# Patient Record
Sex: FEMALE | Race: BLACK OR AFRICAN AMERICAN
[De-identification: names, ages, dates, MRNs, and addresses within clinical notes are randomized per-mention and may not be internally consistent; named-entity substitution may affect disease eponyms.]

---

## 2017-08-03 ENCOUNTER — HOSPITAL ENCOUNTER (EMERGENCY)
Dept: HOSPITAL 74 - JER | Age: 62
LOS: 1 days | Discharge: HOME | End: 2017-08-04
Payer: COMMERCIAL

## 2017-08-03 VITALS — BODY MASS INDEX: 37.8 KG/M2

## 2017-08-03 VITALS — TEMPERATURE: 97.4 F

## 2017-08-03 DIAGNOSIS — D64.9: ICD-10-CM

## 2017-08-03 DIAGNOSIS — E78.5: ICD-10-CM

## 2017-08-03 DIAGNOSIS — I10: ICD-10-CM

## 2017-08-03 DIAGNOSIS — J45.909: ICD-10-CM

## 2017-08-03 DIAGNOSIS — E11.9: ICD-10-CM

## 2017-08-03 DIAGNOSIS — R10.9: ICD-10-CM

## 2017-08-03 DIAGNOSIS — K29.80: Primary | ICD-10-CM

## 2017-08-03 LAB
ALBUMIN SERPL-MCNC: 3.7 G/DL (ref 3.4–5)
ALP SERPL-CCNC: 160 U/L (ref 45–117)
ALT SERPL-CCNC: 21 U/L (ref 12–78)
ANION GAP SERPL CALC-SCNC: 8 MMOL/L (ref 8–16)
AST SERPL-CCNC: 22 U/L (ref 15–37)
BASOPHILS # BLD: 0.7 % (ref 0–2)
BILIRUB SERPL-MCNC: 0.3 MG/DL (ref 0.2–1)
CALCIUM SERPL-MCNC: 11 MG/DL (ref 8.5–10.1)
CK SERPL-CCNC: 84 IU/L (ref 26–192)
CO2 SERPL-SCNC: 27 MMOL/L (ref 21–32)
CREAT SERPL-MCNC: 1 MG/DL (ref 0.55–1.02)
DEPRECATED RDW RBC AUTO: 14.9 % (ref 11.6–15.6)
EOSINOPHIL # BLD: 0.3 % (ref 0–4.5)
GLUCOSE SERPL-MCNC: 367 MG/DL (ref 74–106)
MCH RBC QN AUTO: 24.9 PG (ref 25.7–33.7)
MCHC RBC AUTO-ENTMCNC: 30.9 G/DL (ref 32–36)
MCV RBC: 80.8 FL (ref 80–96)
NEUTROPHILS # BLD: 73 % (ref 42.8–82.8)
PLATELET # BLD AUTO: 495 K/MM3 (ref 134–434)
PMV BLD: 7.6 FL (ref 7.5–11.1)
PROT SERPL-MCNC: 8.5 G/DL (ref 6.4–8.2)
TROPONIN I SERPL-MCNC: < 0.02 NG/ML (ref 0–0.05)
WBC # BLD AUTO: 11.5 K/MM3 (ref 4–10)

## 2017-08-03 PROCEDURE — 3E033GC INTRODUCTION OF OTHER THERAPEUTIC SUBSTANCE INTO PERIPHERAL VEIN, PERCUTANEOUS APPROACH: ICD-10-PCS

## 2017-08-03 PROCEDURE — 3E033NZ INTRODUCTION OF ANALGESICS, HYPNOTICS, SEDATIVES INTO PERIPHERAL VEIN, PERCUTANEOUS APPROACH: ICD-10-PCS

## 2017-08-03 NOTE — PDOC
History of Present Illness





- General


History Source: Patient


Exam Limitations: No Limitations





- History of Present Illness


Initial Comments: 


The patient is a 61 yo F with a past medical history significant for asthma, HTN

, HLD, DM who speaks creole who presents with nausea, vomiting, indigestion, 

gas and acid reflux since last night. The patient also endorses RUQ pain. She 

states her last BM was 6 days ago. The patient states that every time she eats 

she throws up. The patient states she is unsure of her last colonoscopy. She 

notes its not normal to have 1 week between her BMs. The patient denies fevers

, chills, and diarrhea. The patient states she was unable to sleep last night 

secondary to the pain. The patient denies dysuria, hematuria, urgency and 

frequency. The patient states she takes stool softening medication and 

medication for acid reflux.  





PCP: Dr. Hansen (non affiliate)








<Fatuma Voss - Last Filed: 08/04/17 00:39>





<Vanessa Rich - Last Filed: 08/04/17 03:34>





- General


Chief Complaint: Pain


Stated Complaint: PAIN


Time Seen by Provider: 08/03/17 18:02





Past History





<Fatuma Voss - Last Filed: 08/04/17 00:39>





- Past Medical History


Anemia: Yes


Diabetes: Yes


HTN: Yes





- Psycho/Social/Smoking Cessation Hx


Anxiety: No


Suicidal Ideation: No


Smoking History: Unknown if ever smoked


Have you smoked in the past 12 months: No


Information on smoking cessation initiated: No


Hx Alcohol Use: No


Drug/Substance Use Hx: No


Substance Use Type: None





<Vanessa Rich - Last Filed: 08/04/17 03:34>





- Past Medical History


Allergies/Adverse Reactions: 


 Allergies











Allergy/AdvReac Type Severity Reaction Status Date / Time


 


No Known Allergies Allergy   Verified 08/03/17 19:02














**Review of Systems





- Review of Systems


Able to Perform ROS?: Yes


Comments:: 


GENERAL/CONSTITUTIONAL: No fever or chills. No weakness.


HEAD, EYES, EARS, NOSE AND THROAT: No change in vision. No ear pain or 

discharge. No sore throat.


CARDIOVASCULAR: No chest pain or shortness of breath.


RESPIRATORY: No cough, wheezing, or hemoptysis.


GASTROINTESTINAL: + nausea, vomiting, indigestion, gas and acid reflux


No diarrhea.


GENITOURINARY: No dysuria, frequency, or change in urination.


MUSCULOSKELETAL: No joint or muscle swelling or pain. No neck or back pain.


SKIN: No rash


NEUROLOGIC: No headache, vertigo, loss of consciousness, or change in strength/

sensation.


ENDOCRINE: No increased thirst. No abnormal weight change.


HEMATOLOGIC/LYMPHATIC: No anemia, easy bleeding, or history of blood clots.


ALLERGIC/IMMUNOLOGIC: No hives or skin allergy.





<Fatuma Voss - Last Filed: 08/04/17 00:39>





*Physical Exam





- Vital Signs


 Last Vital Signs











Temp Pulse Resp BP Pulse Ox


 


 97.4 F L  106 H  18   116/86   100 


 


 08/03/17 17:46  08/03/17 17:46  08/03/17 17:46  08/03/17 17:46  08/03/17 17:46














- Physical Exam


Comments: 


GENERAL: Awake, alert, and fully oriented, in no acute distress. Well appearing.


HEAD: No signs of trauma


EYES: PERRLA, EOMI, sclera anicteric, conjunctiva clear


ENT: Auricles normal inspection, hearing grossly normal, nares patent, 

oropharynx clear without exudates. Moist mucosa


NECK: Normal ROM, supple, no lymphadenopathy, JVD, or masses


LUNGS: Breath sounds equal, clear to auscultation bilaterally.  No wheezes, and 

no crackles


HEART: Regular rate and rhythm, normal S1 and S2, no murmurs, rubs or gallops


ABDOMEN: Soft, obese, diffusely tender mostly in the RUQ, normoactive bowel 

sounds.  No guarding, no rebound.  No masses


EXTREMITIES: Normal range of motion, no edema.  No clubbing or cyanosis. No 

cords, erythema, or tenderness


NEUROLOGICAL: Cranial nerves II through XII grossly intact.  Normal speech, 

gait not assessed.


SKIN: Warm, Dry, normal turgor, no rashes or lesions noted.








<Fatuma Voss - Last Filed: 08/04/17 00:39>





- Vital Signs


 Last Vital Signs











Temp Pulse Resp BP Pulse Ox


 


 97.4 F L  106 H  18   116/86   100 


 


 08/03/17 17:46  08/03/17 17:46  08/03/17 17:46  08/03/17 17:46  08/03/17 17:46














<Vanessa Rich - Last Filed: 08/04/17 03:34>





**Heart Score/ECG Review


  ** #1


Sinus Tach rate of 103 bpm. Normal access and normal intervals. T wave 

inversion in AVL. No ST elevations. 








<Fatuma Voss - Last Filed: 08/04/17 00:39>





  ** #2


ECG reviewed & interpreted by me at: 01:00





<Vanessa Rich - Last Filed: 08/04/17 03:34>





ED Treatment Course





- LABORATORY


CBC & Chemistry Diagram: 


 08/03/17 20:10





 08/03/17 20:10





- ADDITIONAL ORDERS


Additional order review: 


 











  08/03/17





  20:10


 


RBC  3.61


 


MCV  80.8


 


MCHC  30.9 L


 


RDW  14.9


 


MPV  7.6


 


Neutrophils %  73.0


 


Lymphocytes %  18.0


 


Monocytes %  8.0


 


Eosinophils %  0.3


 


Basophils %  0.7














- RADIOLOGY


Radiograph Interpretation: 


CT Abdomen and Pelvis


FINDINGS: Heart is mildly enlarged visualized cardiac chambers are normal size 

and configuration. Normal liver, gallbladder, pancreas, spleen, adrenal glands 

and kidneys. A small hiatal hernia is noted. There is mild duodenal 

inflammation secondary to peptic ulcer disease. No abscess or free air. No 

colonic inflammation. There is no aortic aneurysm. There is no significant 

retroperitoneal lymphadenopathy.  The pelvic small and large bowel are normal. 

Appendix is normal. The uterus and adnexal structures are normal. Urinary 

bladder is unremarkable. There is no pelvic free fluid. No discrete pelvic 

lymphadenopathy is identified.   IMPRESSION: Duodenitis may be secondary to 

peptic ulcer disease.





<Fatuma Voss - Last Filed: 08/04/17 00:39>





- LABORATORY


CBC & Chemistry Diagram: 


 08/03/17 20:10





 08/03/17 20:10





- ADDITIONAL ORDERS


Additional order review: 


 











  08/03/17





  20:10


 


RBC  3.61


 


MCV  80.8


 


MCHC  30.9 L


 


RDW  14.9


 


MPV  7.6


 


Neutrophils %  73.0


 


Lymphocytes %  18.0


 


Monocytes %  8.0


 


Eosinophils %  0.3


 


Basophils %  0.7














<Vanessa Rich - Last Filed: 08/04/17 03:34>





Medical Decision Making





- Medical Decision Making


08/04/17 00:15


61yo F hx asthma, HTN, HLD, DM p/w nausea, vomiting, and diffuse abd pain, 

worst in the RUQ. She states her last BM was 6 days ago, however she reports 

chronic constipation. Differential is wide and includes cholecystitis vs 

cholelithiasis vs gastritis vs GERD given pain is worse in the upper quadrants. 

Will start with labs, RUQ US, and pain medication and reassess need for further 

imaging such at CTAP to evaluate for other intrabdominal pathology





 Laboratory Results - last 24 hr











  08/03/17 08/03/17 08/03/17





  19:05 20:00 20:10


 


WBC    11.5 H


 


RBC    3.61


 


Hgb    9.0 L


 


Hct    29.2 L


 


MCV    80.8


 


MCH    24.9 L


 


MCHC    30.9 L


 


RDW    14.9


 


Plt Count    495 H


 


MPV    7.6


 


Neutrophils %    73.0


 


Lymphocytes %    18.0


 


Monocytes %    8.0


 


Eosinophils %    0.3


 


Basophils %    0.7


 


Sodium   


 


Potassium   


 


Chloride   


 


Carbon Dioxide   


 


Anion Gap   


 


BUN   


 


Creatinine   


 


Creat Clearance w eGFR   


 


Random Glucose   


 


Lactic Acid   2.3 H* 


 


Calcium   


 


Total Bilirubin   


 


AST   


 


ALT   


 


Alkaline Phosphatase   


 


Creatine Kinase   


 


Troponin I   


 


Total Protein   


 


Albumin   


 


Lipase   


 


Urine Color  Straw  


 


Urine Appearance  Clear  


 


Urine pH  7.0  


 


Urine Protein  Negative  


 


Urine Glucose (UA)  3+ H  


 


Urine Ketones  Negative  


 


Urine Blood  Negative  


 


Urine Nitrite  Negative  


 


Urine Bilirubin  Negative  


 


Urine Urobilinogen  Negative  


 


Ur Leukocyte Esterase  Negative  














  08/03/17 08/03/17





  20:10 20:10


 


WBC  


 


RBC  


 


Hgb  


 


Hct  


 


MCV  


 


MCH  


 


MCHC  


 


RDW  


 


Plt Count  


 


MPV  


 


Neutrophils %  


 


Lymphocytes %  


 


Monocytes %  


 


Eosinophils %  


 


Basophils %  


 


Sodium  131 L 


 


Potassium  4.0 


 


Chloride  96 L 


 


Carbon Dioxide  27 


 


Anion Gap  8 


 


BUN  10 


 


Creatinine  1.0 


 


Creat Clearance w eGFR  55.82 


 


Random Glucose  367 H* 


 


Lactic Acid  


 


Calcium  11.0 H 


 


Total Bilirubin  0.3 


 


AST  22 


 


ALT  21 


 


Alkaline Phosphatase  160 H 


 


Creatine Kinase   84


 


Troponin I   < 0.02


 


Total Protein  8.5 H 


 


Albumin  3.7 


 


Lipase  159 


 


Urine Color  


 


Urine Appearance  


 


Urine pH  


 


Urine Protein  


 


Urine Glucose (UA)  


 


Urine Ketones  


 


Urine Blood  


 


Urine Nitrite  


 


Urine Bilirubin  


 


Urine Urobilinogen  


 


Ur Leukocyte Esterase  














08/04/17 00:20


US unremarkable. UA unremarkable. Labs with mild leukocytosis to 11.5, lactate 

mildly elevated to 2.4. Troponin is negative. On serial abd exams, pt continues 

to be diffusely tender, CTAP ordered. 








08/04/17 03:25


CTAP with possible duodenitis, no other acute pathology. Pt ordered for GI 

cocktail and given food for PO challenge. Repeat EKG stable compared to first 

EKG, (NSR, rate 91, normal axis and intervals, stable TWI in avL, no MAGGIE. 

Repeat trop and lactate ordered. If rpt trop/lactate wnl, and pt tolerates PO, 

pt can be DC to follow up with PMD within 1-2 days. 








08/04/17 03:30








<Vanessa Rich - Last Filed: 08/04/17 03:34>





*DC/Admit/Observation/Transfer





- Attestations


Scribe Attestion: 





Documentation prepared by Fatuma Voss, acting as medical scribe for Vanessa Rich MD, MD/DO.





<Fatuma Voss - Last Filed: 08/04/17 00:39>





<Vanessa Rich - Last Filed: 08/04/17 03:34>


Diagnosis at time of Disposition: 


Abdominal pain


Qualifiers:


 Abdominal location: unspecified location Qualified Code(s): R10.9 - 

Unspecified abdominal pain





- Referrals


Referrals: 


Reza Liu [Primary Care Provider] -

## 2017-08-04 VITALS — DIASTOLIC BLOOD PRESSURE: 76 MMHG | HEART RATE: 80 BPM | SYSTOLIC BLOOD PRESSURE: 116 MMHG

## 2017-08-04 LAB
APPEARANCE UR: CLEAR
BILIRUB UR STRIP.AUTO-MCNC: NEGATIVE MG/DL
COLOR UR: (no result)
KETONES UR QL STRIP: NEGATIVE
LEUKOCYTE ESTERASE UR QL STRIP.AUTO: NEGATIVE
NITRITE UR QL STRIP: NEGATIVE
PH UR: 7 [PH] (ref 5–8)
PROT UR QL STRIP: (no result)
PROT UR QL STRIP: NEGATIVE
RBC # UR STRIP: NEGATIVE /UL
SP GR UR: 1.01 (ref 1–1.02)
UROBILINOGEN UR STRIP-MCNC: NEGATIVE MG/DL (ref 0.2–1)

## 2017-08-04 NOTE — EKG
Test Reason : 

Blood Pressure : ***/*** mmHG

Vent. Rate : 103 BPM     Atrial Rate : 103 BPM

   P-R Int : 146 ms          QRS Dur : 078 ms

    QT Int : 334 ms       P-R-T Axes : 065 -12 069 degrees

   QTc Int : 437 ms

 

SINUS TACHYCARDIA

MINIMAL VOLTAGE CRITERIA FOR LVH, MAY BE NORMAL VARIANT

NO PREVIOUS ECGS AVAILABLE

Confirmed by DEVANG MARQUEZ MD ([id number]) on 8/4/2017 10:24:33 AM

 

Referred By:             Confirmed By:DEVANG MARQUEZ MD

## 2017-08-04 NOTE — PDOC
*Physical Exam





- Vital Signs


 Last Vital Signs











Temp Pulse Resp BP Pulse Ox


 


 97.4 F L  106 H  18   116/86   100 


 


 08/03/17 17:46  08/03/17 17:46  08/03/17 17:46  08/03/17 17:46  08/03/17 17:46














ED Treatment Course





- LABORATORY


CBC & Chemistry Diagram: 


 08/03/17 20:10





 08/03/17 20:10





- ADDITIONAL ORDERS


Additional order review: 


 Laboratory  Results











  08/04/17 08/04/17 08/03/17





  03:07 03:07 20:10


 


Sodium   


 


Potassium   


 


Chloride   


 


Carbon Dioxide   


 


Anion Gap   


 


BUN   


 


Creatinine   


 


Creat Clearance w eGFR   


 


Random Glucose   


 


Lactic Acid   1.1 


 


Calcium   


 


Total Bilirubin   


 


AST   


 


ALT   


 


Alkaline Phosphatase   


 


Creatine Kinase    84


 


Troponin I  < 0.02   < 0.02


 


Total Protein   


 


Albumin   


 


Lipase   


 


Urine Color   


 


Urine Appearance   


 


Urine pH   


 


Urine Protein   


 


Urine Glucose (UA)   


 


Urine Ketones   


 


Urine Blood   


 


Urine Nitrite   


 


Urine Bilirubin   


 


Urine Urobilinogen   


 


Ur Leukocyte Esterase   














  08/03/17 08/03/17 08/03/17





  20:10 20:00 19:05


 


Sodium  131 L  


 


Potassium  4.0  


 


Chloride  96 L  


 


Carbon Dioxide  27  


 


Anion Gap  8  


 


BUN  10  


 


Creatinine  1.0  


 


Creat Clearance w eGFR  55.82  


 


Random Glucose  367 H*  


 


Lactic Acid   2.3 H* 


 


Calcium  11.0 H  


 


Total Bilirubin  0.3  


 


AST  22  


 


ALT  21  


 


Alkaline Phosphatase  160 H  


 


Creatine Kinase   


 


Troponin I   


 


Total Protein  8.5 H  


 


Albumin  3.7  


 


Lipase  159  


 


Urine Color    Straw


 


Urine Appearance    Clear


 


Urine pH    7.0


 


Urine Protein    Negative


 


Urine Glucose (UA)    3+ H


 


Urine Ketones    Negative


 


Urine Blood    Negative


 


Urine Nitrite    Negative


 


Urine Bilirubin    Negative


 


Urine Urobilinogen    Negative


 


Ur Leukocyte Esterase    Negative








 











  08/03/17





  20:10


 


RBC  3.61


 


MCV  80.8


 


MCHC  30.9 L


 


RDW  14.9


 


MPV  7.6


 


Neutrophils %  73.0


 


Lymphocytes %  18.0


 


Monocytes %  8.0


 


Eosinophils %  0.3


 


Basophils %  0.7














- Medications


Given in the ED: 


ED Medications














Discontinued Medications














Generic Name Dose Route Start Last Admin





  Trade Name Freq  PRN Reason Stop Dose Admin


 


Pantoprazole Sodium 40 mg/  100 mls @ 200 mls/hr 08/04/17 00:41 08/04/17 02:19





  Sodium Chloride  IVPB 08/04/17 01:10  200 mls/hr





  ONCE ONE   Administration


 


Famotidine/Sodium Chloride  50 mls @ 100 mls/hr 08/04/17 00:41 08/04/17 02:19





  Pepcid 20 Mg Premixed Ivpb -  IVPB 08/04/17 01:10  100 mls/hr





  ONCE ONE   Administration


 


Morphine Sulfate  2 mg 08/03/17 19:21 08/03/17 20:47





  Morphine Injection -  IVPUSH 08/03/17 19:22  2 mg





  ONCE ONE   Administration


 


Ondansetron HCl  4 mg 08/03/17 19:02 08/03/17 20:41





  Zofran Injection  IVPUSH 08/03/17 19:03  4 mg





  ONCE ONE   Administration


 


Ondansetron HCl  4 mg 08/04/17 00:41 08/04/17 02:19





  Zofran Injection  IVPB 08/04/17 00:42  4 mg





  ONCE ONE   Administration


 


Sodium Chloride  1,000 ml 08/03/17 19:02 08/03/17 20:46





  Normal Saline -  IV 08/03/17 19:03  1,000 ml





  ONCE ONE   Administration














Medical Decision Making





- Medical Decision Making





08/04/17 05:10


Repeat Lactate and Troponin are negative


Will DC Home





*DC/Admit/Observation/Transfer


Diagnosis at time of Disposition: 


 Acute duodenitis





Abdominal pain


Qualifiers:


 Abdominal location: unspecified location Qualified Code(s): R10.9 - 

Unspecified abdominal pain





- Discharge Dispostion


Disposition: HOME


Condition at time of disposition: Improved


Admit: No





- Prescriptions


Prescriptions: 


Pantoprazole Sodium [Protonix -] 40 mg PO BID #14 tablet.ec





- Referrals


Referrals: 


Reza Liu [Primary Care Provider] - 





- Patient Instructions


Printed Discharge Instructions:  DI for Duodenitis


Additional Instructions: 


Return to us if worse.  See your doctor on monday.





Tristin-


Dr. Gee Gauthier





- Post Discharge Activity

## 2017-08-04 NOTE — EKG
Test Reason : 

Blood Pressure : ***/*** mmHG

Vent. Rate : 091 BPM     Atrial Rate : 091 BPM

   P-R Int : 154 ms          QRS Dur : 082 ms

    QT Int : 364 ms       P-R-T Axes : 061 -06 059 degrees

   QTc Int : 447 ms

 

NORMAL SINUS RHYTHM

MINIMAL VOLTAGE CRITERIA FOR LVH, MAY BE NORMAL VARIANT

WHEN COMPARED WITH ECG OF 03-AUG-2017 17:49,

NO SIGNIFICANT CHANGE WAS FOUND

Confirmed by DEVANG MARQUEZ MD ([id number]) on 8/4/2017 10:05:56 AM

 

Referred By:             Confirmed By:DEVANG MARQUEZ MD

## 2021-09-18 ENCOUNTER — HOSPITAL ENCOUNTER (INPATIENT)
Dept: HOSPITAL 74 - JER | Age: 66
LOS: 4 days | Discharge: HOME HEALTH | DRG: 45 | End: 2021-09-22
Attending: NURSE PRACTITIONER | Admitting: FAMILY MEDICINE
Payer: COMMERCIAL

## 2021-09-18 VITALS — BODY MASS INDEX: 35.8 KG/M2

## 2021-09-18 DIAGNOSIS — I10: ICD-10-CM

## 2021-09-18 DIAGNOSIS — R29.702: ICD-10-CM

## 2021-09-18 DIAGNOSIS — I63.9: Primary | ICD-10-CM

## 2021-09-18 DIAGNOSIS — E78.5: ICD-10-CM

## 2021-09-18 DIAGNOSIS — R29.810: ICD-10-CM

## 2021-09-18 DIAGNOSIS — E11.65: ICD-10-CM

## 2021-09-18 DIAGNOSIS — K21.9: ICD-10-CM

## 2021-09-18 DIAGNOSIS — E66.9: ICD-10-CM

## 2021-09-18 DIAGNOSIS — G93.89: ICD-10-CM

## 2021-09-18 DIAGNOSIS — I48.92: ICD-10-CM

## 2021-09-18 DIAGNOSIS — J45.909: ICD-10-CM

## 2021-09-18 DIAGNOSIS — I48.91: ICD-10-CM

## 2021-09-18 LAB
ALBUMIN SERPL-MCNC: 3.5 G/DL (ref 3.4–5)
ALP SERPL-CCNC: 166 U/L (ref 45–117)
ALT SERPL-CCNC: 20 U/L (ref 13–61)
ANION GAP SERPL CALC-SCNC: 9 MMOL/L (ref 8–16)
ANISOCYTOSIS BLD QL: 0
APTT BLD: 29.2 SECONDS (ref 25.2–36.5)
AST SERPL-CCNC: 13 U/L (ref 15–37)
BILIRUB SERPL-MCNC: 0.4 MG/DL (ref 0.2–1)
BUN SERPL-MCNC: 17.7 MG/DL (ref 7–18)
CALCIUM SERPL-MCNC: 10.4 MG/DL (ref 8.5–10.1)
CHLORIDE SERPL-SCNC: 104 MMOL/L (ref 98–107)
CO2 SERPL-SCNC: 24 MMOL/L (ref 21–32)
CREAT SERPL-MCNC: 0.9 MG/DL (ref 0.55–1.3)
DEPRECATED RDW RBC AUTO: 12.9 % (ref 11.6–15.6)
GLUCOSE SERPL-MCNC: 305 MG/DL (ref 74–106)
HCT VFR BLD CALC: 34.9 % (ref 32.4–45.2)
HGB BLD-MCNC: 11.4 GM/DL (ref 10.7–15.3)
INR BLD: 1.1 (ref 0.83–1.09)
LIPASE SERPL-CCNC: 60 U/L (ref 73–393)
MACROCYTES BLD QL: 0
MCH RBC QN AUTO: 28.5 PG (ref 25.7–33.7)
MCHC RBC AUTO-ENTMCNC: 32.6 G/DL (ref 32–36)
MCV RBC: 87.6 FL (ref 80–96)
PLATELET # BLD AUTO: 368 10^3/UL (ref 134–434)
PLATELET BLD QL SMEAR: NORMAL
PMV BLD: 8.5 FL (ref 7.5–11.1)
PROT SERPL-MCNC: 8.1 G/DL (ref 6.4–8.2)
PT PNL PPP: 13.5 SEC (ref 9.7–13)
RBC # BLD AUTO: 3.98 M/MM3 (ref 3.6–5.2)
SODIUM SERPL-SCNC: 137 MMOL/L (ref 136–145)
WBC # BLD AUTO: 14 K/MM3 (ref 4–10)

## 2021-09-18 PROCEDURE — U0005 INFEC AGEN DETEC AMPLI PROBE: HCPCS

## 2021-09-18 PROCEDURE — U0003 INFECTIOUS AGENT DETECTION BY NUCLEIC ACID (DNA OR RNA); SEVERE ACUTE RESPIRATORY SYNDROME CORONAVIRUS 2 (SARS-COV-2) (CORONAVIRUS DISEASE [COVID-19]), AMPLIFIED PROBE TECHNIQUE, MAKING USE OF HIGH THROUGHPUT TECHNOLOGIES AS DESCRIBED BY CMS-2020-01-R: HCPCS

## 2021-09-18 PROCEDURE — C9803 HOPD COVID-19 SPEC COLLECT: HCPCS

## 2021-09-18 PROCEDURE — G0378 HOSPITAL OBSERVATION PER HR: HCPCS

## 2021-09-19 LAB
ALBUMIN SERPL-MCNC: 3.4 G/DL (ref 3.4–5)
ALP SERPL-CCNC: 154 U/L (ref 45–117)
ALT SERPL-CCNC: 19 U/L (ref 13–61)
ANION GAP SERPL CALC-SCNC: 10 MMOL/L (ref 8–16)
APPEARANCE UR: CLEAR
AST SERPL-CCNC: 14 U/L (ref 15–37)
BACTERIA # UR AUTO: 441 /UL (ref 0–1359)
BASOPHILS # BLD: 0.5 % (ref 0–2)
BILIRUB SERPL-MCNC: 0.5 MG/DL (ref 0.2–1)
BILIRUB UR STRIP.AUTO-MCNC: NEGATIVE MG/DL
BUN SERPL-MCNC: 13.9 MG/DL (ref 7–18)
CALCIUM SERPL-MCNC: 10.3 MG/DL (ref 8.5–10.1)
CASTS URNS QL MICRO: 1 /UL (ref 0–3.1)
CHLORIDE SERPL-SCNC: 107 MMOL/L (ref 98–107)
CO2 SERPL-SCNC: 22 MMOL/L (ref 21–32)
COLOR UR: YELLOW
CREAT SERPL-MCNC: 0.8 MG/DL (ref 0.55–1.3)
DEPRECATED RDW RBC AUTO: 12.8 % (ref 11.6–15.6)
EOSINOPHIL # BLD: 0.2 % (ref 0–4.5)
EPITH CASTS URNS QL MICRO: 31 /UL (ref 0–25.1)
GLUCOSE SERPL-MCNC: 216 MG/DL (ref 74–106)
HCT VFR BLD CALC: 34.4 % (ref 32.4–45.2)
HGB BLD-MCNC: 11.3 GM/DL (ref 10.7–15.3)
KETONES UR QL STRIP: NEGATIVE
LEUKOCYTE ESTERASE UR QL STRIP.AUTO: NEGATIVE
LYMPHOCYTES # BLD: 10.2 % (ref 8–40)
MAGNESIUM SERPL-MCNC: 2 MG/DL (ref 1.8–2.4)
MCH RBC QN AUTO: 28.7 PG (ref 25.7–33.7)
MCHC RBC AUTO-ENTMCNC: 32.7 G/DL (ref 32–36)
MCV RBC: 87.8 FL (ref 80–96)
MONOCYTES # BLD AUTO: 5.5 % (ref 3.8–10.2)
NEUTROPHILS # BLD: 83.6 % (ref 42.8–82.8)
NITRITE UR QL STRIP: NEGATIVE
PH UR: 6 [PH] (ref 5–8)
PLATELET # BLD AUTO: 334 10^3/UL (ref 134–434)
PMV BLD: 8.2 FL (ref 7.5–11.1)
PROT SERPL-MCNC: 7.8 G/DL (ref 6.4–8.2)
PROT UR QL STRIP: (no result)
PROT UR QL STRIP: (no result)
RBC # BLD AUTO: 3.92 M/MM3 (ref 3.6–5.2)
RBC # BLD AUTO: 41 /UL (ref 0–23.9)
SODIUM SERPL-SCNC: 138 MMOL/L (ref 136–145)
SP GR UR: 1.04 (ref 1.01–1.03)
UROBILINOGEN UR STRIP-MCNC: 1 MG/DL (ref 0.2–1)
WBC # BLD AUTO: 14.4 K/MM3 (ref 4–10)
WBC # UR AUTO: 43 /UL (ref 0–25.8)

## 2021-09-19 RX ADMIN — ENOXAPARIN SODIUM SCH MG: 40 INJECTION SUBCUTANEOUS at 10:02

## 2021-09-19 RX ADMIN — INSULIN ASPART SCH UNIT: 100 INJECTION, SOLUTION INTRAVENOUS; SUBCUTANEOUS at 21:31

## 2021-09-19 RX ADMIN — POLYETHYLENE GLYCOL 3350 PRN GM: 17 POWDER, FOR SOLUTION ORAL at 21:29

## 2021-09-19 RX ADMIN — SODIUM CHLORIDE SCH MLS/HR: 9 INJECTION, SOLUTION INTRAVENOUS at 07:20

## 2021-09-19 RX ADMIN — METOCLOPRAMIDE PRN MG: 5 INJECTION, SOLUTION INTRAMUSCULAR; INTRAVENOUS at 09:30

## 2021-09-19 RX ADMIN — INSULIN ASPART SCH UNIT: 100 INJECTION, SOLUTION INTRAVENOUS; SUBCUTANEOUS at 12:55

## 2021-09-19 RX ADMIN — INSULIN ASPART SCH UNIT: 100 INJECTION, SOLUTION INTRAVENOUS; SUBCUTANEOUS at 18:00

## 2021-09-20 RX ADMIN — ENOXAPARIN SODIUM SCH MG: 100 INJECTION SUBCUTANEOUS at 21:42

## 2021-09-20 RX ADMIN — METOCLOPRAMIDE PRN MG: 5 INJECTION, SOLUTION INTRAMUSCULAR; INTRAVENOUS at 05:03

## 2021-09-20 RX ADMIN — ASPIRIN 81 MG SCH MG: 81 TABLET ORAL at 11:48

## 2021-09-20 RX ADMIN — INSULIN ASPART SCH UNIT: 100 INJECTION, SOLUTION INTRAVENOUS; SUBCUTANEOUS at 06:06

## 2021-09-20 RX ADMIN — INSULIN DETEMIR SCH UNITS: 100 INJECTION, SOLUTION SUBCUTANEOUS at 21:41

## 2021-09-20 RX ADMIN — POLYETHYLENE GLYCOL 3350 PRN GM: 17 POWDER, FOR SOLUTION ORAL at 21:42

## 2021-09-20 RX ADMIN — ENOXAPARIN SODIUM SCH MG: 40 INJECTION SUBCUTANEOUS at 11:49

## 2021-09-20 RX ADMIN — INSULIN ASPART SCH UNIT: 100 INJECTION, SOLUTION INTRAVENOUS; SUBCUTANEOUS at 12:35

## 2021-09-20 RX ADMIN — INSULIN ASPART SCH UNIT: 100 INJECTION, SOLUTION INTRAVENOUS; SUBCUTANEOUS at 17:23

## 2021-09-20 RX ADMIN — SODIUM CHLORIDE SCH: 9 INJECTION, SOLUTION INTRAVENOUS at 06:14

## 2021-09-20 RX ADMIN — ATORVASTATIN CALCIUM SCH MG: 40 TABLET, FILM COATED ORAL at 11:48

## 2021-09-20 RX ADMIN — INSULIN ASPART SCH UNIT: 100 INJECTION, SOLUTION INTRAVENOUS; SUBCUTANEOUS at 21:42

## 2021-09-21 LAB
ALBUMIN SERPL-MCNC: 3.2 G/DL (ref 3.4–5)
ALBUMIN SERPL-MCNC: 3.3 G/DL (ref 3.4–5)
ALP SERPL-CCNC: 140 U/L (ref 45–117)
ALP SERPL-CCNC: 149 U/L (ref 45–117)
ALT SERPL-CCNC: 24 U/L (ref 13–61)
ALT SERPL-CCNC: 25 U/L (ref 13–61)
ANION GAP SERPL CALC-SCNC: 5 MMOL/L (ref 8–16)
ANION GAP SERPL CALC-SCNC: 7 MMOL/L (ref 8–16)
APPEARANCE UR: CLEAR
APTT BLD: 44.1 SECONDS (ref 25.2–36.5)
AST SERPL-CCNC: 18 U/L (ref 15–37)
AST SERPL-CCNC: 20 U/L (ref 15–37)
BACTERIA # UR AUTO: 450 /UL (ref 0–1359)
BASOPHILS # BLD: 0.4 % (ref 0–2)
BASOPHILS # BLD: 0.5 % (ref 0–2)
BILIRUB SERPL-MCNC: 0.4 MG/DL (ref 0.2–1)
BILIRUB SERPL-MCNC: 0.6 MG/DL (ref 0.2–1)
BILIRUB UR STRIP.AUTO-MCNC: NEGATIVE MG/DL
BUN SERPL-MCNC: 12.7 MG/DL (ref 7–18)
BUN SERPL-MCNC: 15.1 MG/DL (ref 7–18)
CALCIUM SERPL-MCNC: 10.1 MG/DL (ref 8.5–10.1)
CALCIUM SERPL-MCNC: 10.3 MG/DL (ref 8.5–10.1)
CASTS URNS QL MICRO: 0 /UL (ref 0–3.1)
CHLORIDE SERPL-SCNC: 110 MMOL/L (ref 98–107)
CHLORIDE SERPL-SCNC: 112 MMOL/L (ref 98–107)
CHOLEST SERPL-MCNC: 137 MG/DL (ref 50–200)
CO2 SERPL-SCNC: 25 MMOL/L (ref 21–32)
CO2 SERPL-SCNC: 26 MMOL/L (ref 21–32)
COLOR UR: YELLOW
CREAT SERPL-MCNC: 0.7 MG/DL (ref 0.55–1.3)
CREAT SERPL-MCNC: 0.8 MG/DL (ref 0.55–1.3)
DEPRECATED RDW RBC AUTO: 12.8 % (ref 11.6–15.6)
DEPRECATED RDW RBC AUTO: 12.9 % (ref 11.6–15.6)
EOSINOPHIL # BLD: 1.7 % (ref 0–4.5)
EOSINOPHIL # BLD: 1.8 % (ref 0–4.5)
EPITH CASTS URNS QL MICRO: 6 /UL (ref 0–25.1)
GLUCOSE SERPL-MCNC: 108 MG/DL (ref 74–106)
GLUCOSE SERPL-MCNC: 159 MG/DL (ref 74–106)
HCT VFR BLD CALC: 32.6 % (ref 32.4–45.2)
HCT VFR BLD CALC: 33.6 % (ref 32.4–45.2)
HDLC SERPL-MCNC: 67 MG/DL (ref 40–60)
HGB BLD-MCNC: 10.8 GM/DL (ref 10.7–15.3)
HGB BLD-MCNC: 11.2 GM/DL (ref 10.7–15.3)
INR BLD: 1.07 (ref 0.83–1.09)
KETONES UR QL STRIP: NEGATIVE
LDLC SERPL CALC-MCNC: 56 MG/DL (ref 5–100)
LEUKOCYTE ESTERASE UR QL STRIP.AUTO: NEGATIVE
LYMPHOCYTES # BLD: 27.4 % (ref 8–40)
LYMPHOCYTES # BLD: 32.2 % (ref 8–40)
MCH RBC QN AUTO: 29 PG (ref 25.7–33.7)
MCH RBC QN AUTO: 29.2 PG (ref 25.7–33.7)
MCHC RBC AUTO-ENTMCNC: 33.1 G/DL (ref 32–36)
MCHC RBC AUTO-ENTMCNC: 33.2 G/DL (ref 32–36)
MCV RBC: 87.6 FL (ref 80–96)
MCV RBC: 87.7 FL (ref 80–96)
MONOCYTES # BLD AUTO: 6.8 % (ref 3.8–10.2)
MONOCYTES # BLD AUTO: 8.2 % (ref 3.8–10.2)
NEUTROPHILS # BLD: 57.4 % (ref 42.8–82.8)
NEUTROPHILS # BLD: 63.6 % (ref 42.8–82.8)
NITRITE UR QL STRIP: NEGATIVE
PH UR: 8 [PH] (ref 5–8)
PLATELET # BLD AUTO: 348 10^3/UL (ref 134–434)
PLATELET # BLD AUTO: 363 10^3/UL (ref 134–434)
PMV BLD: 8.1 FL (ref 7.5–11.1)
PMV BLD: 8.6 FL (ref 7.5–11.1)
PROT SERPL-MCNC: 7.4 G/DL (ref 6.4–8.2)
PROT SERPL-MCNC: 7.7 G/DL (ref 6.4–8.2)
PROT UR QL STRIP: NEGATIVE
PROT UR QL STRIP: NEGATIVE
PT PNL PPP: 13.2 SEC (ref 9.7–13)
RBC # BLD AUTO: 146 /UL (ref 0–23.9)
RBC # BLD AUTO: 3.72 M/MM3 (ref 3.6–5.2)
RBC # BLD AUTO: 3.83 M/MM3 (ref 3.6–5.2)
SODIUM SERPL-SCNC: 142 MMOL/L (ref 136–145)
SODIUM SERPL-SCNC: 143 MMOL/L (ref 136–145)
SP GR UR: 1.01 (ref 1.01–1.03)
TRIGL SERPL-MCNC: 39 MG/DL (ref 0–150)
UROBILINOGEN UR STRIP-MCNC: 1 MG/DL (ref 0.2–1)
WBC # BLD AUTO: 9.3 K/MM3 (ref 4–10)
WBC # BLD AUTO: 9.8 K/MM3 (ref 4–10)
WBC # UR AUTO: 8 /UL (ref 0–25.8)

## 2021-09-21 RX ADMIN — LISINOPRIL SCH MG: 20 TABLET ORAL at 09:01

## 2021-09-21 RX ADMIN — INSULIN DETEMIR SCH UNITS: 100 INJECTION, SOLUTION SUBCUTANEOUS at 06:41

## 2021-09-21 RX ADMIN — INSULIN ASPART SCH UNIT: 100 INJECTION, SOLUTION INTRAVENOUS; SUBCUTANEOUS at 18:07

## 2021-09-21 RX ADMIN — POLYETHYLENE GLYCOL 3350 PRN GM: 17 POWDER, FOR SOLUTION ORAL at 09:01

## 2021-09-21 RX ADMIN — INSULIN DETEMIR SCH UNITS: 100 INJECTION, SOLUTION SUBCUTANEOUS at 21:06

## 2021-09-21 RX ADMIN — ASPIRIN 81 MG SCH MG: 81 TABLET ORAL at 09:01

## 2021-09-21 RX ADMIN — APIXABAN SCH MG: 5 TABLET, FILM COATED ORAL at 21:07

## 2021-09-21 RX ADMIN — INSULIN ASPART SCH UNIT: 100 INJECTION, SOLUTION INTRAVENOUS; SUBCUTANEOUS at 21:06

## 2021-09-21 RX ADMIN — ENOXAPARIN SODIUM SCH MG: 100 INJECTION SUBCUTANEOUS at 09:01

## 2021-09-21 RX ADMIN — INSULIN ASPART SCH: 100 INJECTION, SOLUTION INTRAVENOUS; SUBCUTANEOUS at 06:43

## 2021-09-21 RX ADMIN — ATORVASTATIN CALCIUM SCH MG: 40 TABLET, FILM COATED ORAL at 21:05

## 2021-09-21 RX ADMIN — INSULIN ASPART SCH UNIT: 100 INJECTION, SOLUTION INTRAVENOUS; SUBCUTANEOUS at 11:31

## 2021-09-22 VITALS — DIASTOLIC BLOOD PRESSURE: 81 MMHG | HEART RATE: 90 BPM | TEMPERATURE: 97.8 F | SYSTOLIC BLOOD PRESSURE: 154 MMHG

## 2021-09-22 LAB
ALBUMIN SERPL-MCNC: 3.1 G/DL (ref 3.4–5)
ALP SERPL-CCNC: 149 U/L (ref 45–117)
ALT SERPL-CCNC: 26 U/L (ref 13–61)
ANION GAP SERPL CALC-SCNC: 6 MMOL/L (ref 8–16)
AST SERPL-CCNC: 19 U/L (ref 15–37)
BASOPHILS # BLD: 0.5 % (ref 0–2)
BILIRUB SERPL-MCNC: 1 MG/DL (ref 0.2–1)
BUN SERPL-MCNC: 13.1 MG/DL (ref 7–18)
CALCIUM SERPL-MCNC: 10.4 MG/DL (ref 8.5–10.1)
CHLORIDE SERPL-SCNC: 109 MMOL/L (ref 98–107)
CO2 SERPL-SCNC: 25 MMOL/L (ref 21–32)
CREAT SERPL-MCNC: 0.7 MG/DL (ref 0.55–1.3)
DEPRECATED RDW RBC AUTO: 12.8 % (ref 11.6–15.6)
EOSINOPHIL # BLD: 1.9 % (ref 0–4.5)
GLUCOSE SERPL-MCNC: 125 MG/DL (ref 74–106)
HCT VFR BLD CALC: 34.2 % (ref 32.4–45.2)
HGB BLD-MCNC: 11.1 GM/DL (ref 10.7–15.3)
LYMPHOCYTES # BLD: 22.6 % (ref 8–40)
MAGNESIUM SERPL-MCNC: 2.1 MG/DL (ref 1.8–2.4)
MCH RBC QN AUTO: 28.6 PG (ref 25.7–33.7)
MCHC RBC AUTO-ENTMCNC: 32.5 G/DL (ref 32–36)
MCV RBC: 88.1 FL (ref 80–96)
MONOCYTES # BLD AUTO: 6.8 % (ref 3.8–10.2)
NEUTROPHILS # BLD: 68.2 % (ref 42.8–82.8)
PLATELET # BLD AUTO: 382 10^3/UL (ref 134–434)
PMV BLD: 8.6 FL (ref 7.5–11.1)
PROT SERPL-MCNC: 7.4 G/DL (ref 6.4–8.2)
RBC # BLD AUTO: 3.88 M/MM3 (ref 3.6–5.2)
SODIUM SERPL-SCNC: 140 MMOL/L (ref 136–145)
WBC # BLD AUTO: 9.9 K/MM3 (ref 4–10)

## 2021-09-22 RX ADMIN — INSULIN ASPART SCH UNIT: 100 INJECTION, SOLUTION INTRAVENOUS; SUBCUTANEOUS at 17:03

## 2021-09-22 RX ADMIN — INSULIN ASPART SCH: 100 INJECTION, SOLUTION INTRAVENOUS; SUBCUTANEOUS at 06:04

## 2021-09-22 RX ADMIN — INSULIN ASPART SCH UNIT: 100 INJECTION, SOLUTION INTRAVENOUS; SUBCUTANEOUS at 11:54

## 2021-09-22 RX ADMIN — APIXABAN SCH MG: 5 TABLET, FILM COATED ORAL at 09:48

## 2021-09-22 RX ADMIN — LISINOPRIL SCH MG: 20 TABLET ORAL at 09:48

## 2021-09-22 RX ADMIN — ASPIRIN 81 MG SCH MG: 81 TABLET ORAL at 09:48

## 2021-09-22 RX ADMIN — INSULIN DETEMIR SCH UNITS: 100 INJECTION, SOLUTION SUBCUTANEOUS at 07:16

## 2023-11-25 ENCOUNTER — HOSPITAL ENCOUNTER (EMERGENCY)
Dept: HOSPITAL 74 - JER | Age: 68
Discharge: HOME | End: 2023-11-25
Payer: COMMERCIAL

## 2023-11-25 VITALS
SYSTOLIC BLOOD PRESSURE: 161 MMHG | DIASTOLIC BLOOD PRESSURE: 89 MMHG | RESPIRATION RATE: 20 BRPM | HEART RATE: 88 BPM | TEMPERATURE: 98.7 F

## 2023-11-25 VITALS — BODY MASS INDEX: 38.2 KG/M2

## 2023-11-25 DIAGNOSIS — Z20.822: ICD-10-CM

## 2023-11-25 DIAGNOSIS — R51.9: ICD-10-CM

## 2023-11-25 DIAGNOSIS — R42: Primary | ICD-10-CM

## 2023-11-25 DIAGNOSIS — I10: ICD-10-CM

## 2023-11-25 LAB
ALBUMIN SERPL-MCNC: 3.2 G/DL (ref 3.4–5)
ALP SERPL-CCNC: 131 U/L (ref 45–117)
ALT SERPL-CCNC: 23 U/L (ref 13–61)
ANION GAP SERPL CALC-SCNC: 7 MMOL/L (ref 4–13)
ANION GAP SERPL CALC-SCNC: 9 MMOL/L (ref 4–13)
APPEARANCE UR: (no result)
APTT BLD: 36 SECONDS (ref 25.2–36.5)
AST SERPL-CCNC: 29 U/L (ref 15–37)
BACTERIA # UR AUTO: 841 /UL (ref 0–1359)
BASOPHILS # BLD: 1.1 % (ref 0–2)
BILIRUB SERPL-MCNC: 0.6 MG/DL (ref 0.2–1)
BILIRUB UR STRIP.AUTO-MCNC: NEGATIVE MG/DL
BUN SERPL-MCNC: 15.4 MG/DL (ref 7–18)
BUN SERPL-MCNC: 16.4 MG/DL (ref 7–18)
CALCIUM SERPL-MCNC: 10.6 MG/DL (ref 8.5–10.1)
CALCIUM SERPL-MCNC: 11.2 MG/DL (ref 8.5–10.1)
CASTS URNS QL MICRO: 3 /UL (ref 0–3.1)
CHLORIDE SERPL-SCNC: 106 MMOL/L (ref 98–107)
CHLORIDE SERPL-SCNC: 106 MMOL/L (ref 98–107)
CO2 SERPL-SCNC: 25 MMOL/L (ref 21–32)
CO2 SERPL-SCNC: 26 MMOL/L (ref 21–32)
COLOR UR: YELLOW
CREAT SERPL-MCNC: 1.3 MG/DL (ref 0.55–1.3)
CREAT SERPL-MCNC: 1.4 MG/DL (ref 0.55–1.3)
DEPRECATED RDW RBC AUTO: 14 % (ref 11.6–15.6)
EOSINOPHIL # BLD: 3.4 % (ref 0–4.5)
EPITH CASTS URNS QL MICRO: >36 /UL (ref 0–25.1)
GLUCOSE SERPL-MCNC: 146 MG/DL (ref 74–106)
GLUCOSE SERPL-MCNC: 97 MG/DL (ref 74–106)
HCT VFR BLD CALC: 32.6 % (ref 32.4–45.2)
HGB BLD-MCNC: 10.3 GM/DL (ref 10.7–15.3)
INR BLD: 1.2 (ref 0.83–1.09)
KETONES UR QL STRIP: NEGATIVE
LEUKOCYTE ESTERASE UR QL STRIP.AUTO: (no result)
LYMPHOCYTES # BLD: 24 % (ref 8–40)
MAGNESIUM SERPL-MCNC: 1.7 MG/DL (ref 1.8–2.4)
MCH RBC QN AUTO: 27.8 PG (ref 25.7–33.7)
MCHC RBC AUTO-ENTMCNC: 31.5 G/DL (ref 32–36)
MCV RBC: 88.2 FL (ref 80–96)
MONOCYTES # BLD AUTO: 7.6 % (ref 3.8–10.2)
NEUTROPHILS # BLD: 63.9 % (ref 42.8–82.8)
NITRITE UR QL STRIP: NEGATIVE
PH UR: 6.5 [PH] (ref 5–8)
PLATELET # BLD AUTO: 486 10^3/UL (ref 134–434)
PMV BLD: 7.4 FL (ref 7.5–11.1)
POTASSIUM SERPLBLD-SCNC: 3.7 MMOL/L (ref 3.5–5.1)
POTASSIUM SERPLBLD-SCNC: 4.5 MMOL/L (ref 3.5–5.1)
PROT SERPL-MCNC: 8 G/DL (ref 6.4–8.2)
PROT UR QL STRIP: NEGATIVE
PROT UR QL STRIP: NEGATIVE
PT PNL PPP: 13.9 SEC (ref 9.7–13)
RBC # BLD AUTO: 14 /UL (ref 0–23.9)
RBC # BLD AUTO: 3.7 M/MM3 (ref 3.6–5.2)
SODIUM SERPL-SCNC: 138 MMOL/L (ref 136–145)
SODIUM SERPL-SCNC: 141 MMOL/L (ref 136–145)
SP GR UR: 1.01 (ref 1.01–1.03)
UROBILINOGEN UR STRIP-MCNC: 0.2 MG/DL (ref 0.2–1)
WBC # BLD AUTO: 8.5 K/MM3 (ref 4–10)
WBC # UR AUTO: 113 /UL (ref 0–25.8)
YEAST BUDDING URNS QL: (no result)

## 2023-11-25 PROCEDURE — 3E033GC INTRODUCTION OF OTHER THERAPEUTIC SUBSTANCE INTO PERIPHERAL VEIN, PERCUTANEOUS APPROACH: ICD-10-PCS

## 2023-11-25 PROCEDURE — 3E033NZ INTRODUCTION OF ANALGESICS, HYPNOTICS, SEDATIVES INTO PERIPHERAL VEIN, PERCUTANEOUS APPROACH: ICD-10-PCS

## 2024-05-30 ENCOUNTER — HOSPITAL ENCOUNTER (EMERGENCY)
Dept: HOSPITAL 74 - JERFT | Age: 69
Discharge: HOME | End: 2024-05-30
Payer: COMMERCIAL

## 2024-05-30 VITALS
HEART RATE: 59 BPM | SYSTOLIC BLOOD PRESSURE: 108 MMHG | RESPIRATION RATE: 17 BRPM | DIASTOLIC BLOOD PRESSURE: 58 MMHG | TEMPERATURE: 98.7 F

## 2024-05-30 VITALS — BODY MASS INDEX: 30.7 KG/M2

## 2024-05-30 DIAGNOSIS — Z76.0: ICD-10-CM

## 2024-05-30 DIAGNOSIS — G40.909: Primary | ICD-10-CM

## 2024-05-30 RX ADMIN — LEVETIRACETAM ONE MG: 500 TABLET, FILM COATED ORAL at 15:06
